# Patient Record
Sex: FEMALE | Race: BLACK OR AFRICAN AMERICAN | NOT HISPANIC OR LATINO | URBAN - METROPOLITAN AREA
[De-identification: names, ages, dates, MRNs, and addresses within clinical notes are randomized per-mention and may not be internally consistent; named-entity substitution may affect disease eponyms.]

---

## 2017-12-20 DIAGNOSIS — N92.6 IRREGULAR MENSTRUAL CYCLE: Primary | ICD-10-CM

## 2017-12-20 DIAGNOSIS — Z13.29 SCREENING FOR THYROID DISORDER: ICD-10-CM

## 2017-12-20 LAB
ERYTHROCYTE [DISTWIDTH] IN BLOOD BY AUTOMATED COUNT: 14.6 % (ref 10–15)
ESTRADIOL SERPL-MCNC: 33 PG/ML
FSH SERPL-ACNC: 6.3 IU/L
HBA1C MFR BLD: 5.8 % (ref 4.3–6)
HCT VFR BLD AUTO: 38.6 % (ref 35–47)
HGB BLD-MCNC: 12.7 G/DL (ref 11.7–15.7)
LH SERPL-ACNC: 2.3 IU/L
MCH RBC QN AUTO: 29.1 PG (ref 26.5–33)
MCHC RBC AUTO-ENTMCNC: 32.9 G/DL (ref 31.5–36.5)
MCV RBC AUTO: 88 FL (ref 78–100)
PLATELET # BLD AUTO: 235 10E9/L (ref 150–450)
RBC # BLD AUTO: 4.37 10E12/L (ref 3.8–5.2)
WBC # BLD AUTO: 4.1 10E9/L (ref 4–11)

## 2017-12-20 PROCEDURE — 83036 HEMOGLOBIN GLYCOSYLATED A1C: CPT | Performed by: OBSTETRICS & GYNECOLOGY

## 2017-12-20 PROCEDURE — 83002 ASSAY OF GONADOTROPIN (LH): CPT | Performed by: OBSTETRICS & GYNECOLOGY

## 2017-12-20 PROCEDURE — 84443 ASSAY THYROID STIM HORMONE: CPT | Performed by: OBSTETRICS & GYNECOLOGY

## 2017-12-20 PROCEDURE — 36415 COLL VENOUS BLD VENIPUNCTURE: CPT | Performed by: OBSTETRICS & GYNECOLOGY

## 2017-12-20 PROCEDURE — 80053 COMPREHEN METABOLIC PANEL: CPT | Performed by: OBSTETRICS & GYNECOLOGY

## 2017-12-20 PROCEDURE — 83520 IMMUNOASSAY QUANT NOS NONAB: CPT | Mod: 90 | Performed by: OBSTETRICS & GYNECOLOGY

## 2017-12-20 PROCEDURE — 82670 ASSAY OF TOTAL ESTRADIOL: CPT | Performed by: OBSTETRICS & GYNECOLOGY

## 2017-12-20 PROCEDURE — 85027 COMPLETE CBC AUTOMATED: CPT | Performed by: OBSTETRICS & GYNECOLOGY

## 2017-12-20 PROCEDURE — 83001 ASSAY OF GONADOTROPIN (FSH): CPT | Performed by: OBSTETRICS & GYNECOLOGY

## 2017-12-20 PROCEDURE — 99000 SPECIMEN HANDLING OFFICE-LAB: CPT | Performed by: OBSTETRICS & GYNECOLOGY

## 2017-12-21 LAB
ALBUMIN SERPL-MCNC: 3.5 G/DL (ref 3.4–5)
ALP SERPL-CCNC: 74 U/L (ref 40–150)
ALT SERPL W P-5'-P-CCNC: 18 U/L (ref 0–50)
ANION GAP SERPL CALCULATED.3IONS-SCNC: 7 MMOL/L (ref 3–14)
AST SERPL W P-5'-P-CCNC: 10 U/L (ref 0–45)
BILIRUB SERPL-MCNC: 0.5 MG/DL (ref 0.2–1.3)
BUN SERPL-MCNC: 8 MG/DL (ref 7–30)
CALCIUM SERPL-MCNC: 8.3 MG/DL (ref 8.5–10.1)
CHLORIDE SERPL-SCNC: 106 MMOL/L (ref 94–109)
CO2 SERPL-SCNC: 27 MMOL/L (ref 20–32)
CREAT SERPL-MCNC: 0.61 MG/DL (ref 0.52–1.04)
GFR SERPL CREATININE-BSD FRML MDRD: >90 ML/MIN/1.7M2
GLUCOSE SERPL-MCNC: 112 MG/DL (ref 70–99)
POTASSIUM SERPL-SCNC: 3.8 MMOL/L (ref 3.4–5.3)
PROT SERPL-MCNC: 7.2 G/DL (ref 6.8–8.8)
SODIUM SERPL-SCNC: 140 MMOL/L (ref 133–144)
TSH SERPL DL<=0.005 MIU/L-ACNC: 0.6 MU/L (ref 0.4–4)

## 2017-12-22 LAB — MIS SERPL-MCNC: 0.12 NG/ML (ref 0.18–11.71)

## 2018-03-07 DIAGNOSIS — H47.20 OPTIC ATROPHY OF BOTH EYES: Primary | ICD-10-CM

## 2018-03-12 ENCOUNTER — OFFICE VISIT (OUTPATIENT)
Dept: OPHTHALMOLOGY | Facility: CLINIC | Age: 38
End: 2018-03-12
Payer: MEDICAID

## 2018-03-12 DIAGNOSIS — H47.213 PRIMARY OPTIC ATROPHY OF BOTH EYES: Primary | ICD-10-CM

## 2018-03-12 ASSESSMENT — VISUAL ACUITY
OS_SC+: +1
CORRECTION_TYPE: GLASSES
OS_SC: 20/70
OD_SC: 20/60
OS_CC: 20/70
METHOD_MR_RETINOSCOPY: 1
OD_SC+: +2
OD_CC+: +1
OD_CC: 20/60
OS_CC+: +1
METHOD: SNELLEN - LINEAR

## 2018-03-12 ASSESSMENT — CONF VISUAL FIELD
OS_NORMAL: 1
OD_NORMAL: 1

## 2018-03-12 ASSESSMENT — REFRACTION_MANIFEST
OS_SPHERE: +1.25
OD_AXIS: 170
OS_CYLINDER: +0.75
OD_SPHERE: +0.50
OD_CYLINDER: +0.50
OS_AXIS: 005
OS_AXIS: 005
OS_ADD: +1.50
OS_SPHERE: +1.25
OS_CYLINDER: +0.75
OD_SPHERE: +0.50
OD_CYLINDER: +0.25
OD_AXIS: 170
OD_ADD: +1.50

## 2018-03-12 ASSESSMENT — REFRACTION_WEARINGRX
OD_SPHERE: +0.50
OS_ADD: +1.50
OS_SPHERE: +0.50
OS_CYLINDER: +0.25
OS_AXIS: 002
OD_ADD: +1.50
OD_CYLINDER: +0.75
OD_AXIS: 175
SPECS_TYPE: TRIAL FRAMES

## 2018-03-12 ASSESSMENT — TONOMETRY
OS_IOP_MMHG: 17
OD_IOP_MMHG: 19
IOP_METHOD: ICARE

## 2018-03-12 ASSESSMENT — EXTERNAL EXAM - RIGHT EYE: OD_EXAM: NORMAL

## 2018-03-12 ASSESSMENT — SLIT LAMP EXAM - LIDS
COMMENTS: NORMAL
COMMENTS: NORMAL

## 2018-03-12 ASSESSMENT — EXTERNAL EXAM - LEFT EYE: OS_EXAM: NORMAL

## 2018-03-12 NOTE — MR AVS SNAPSHOT
After Visit Summary   3/12/2018    Mayra Yee    MRN: [id number]           Patient Information     Date Of Birth          1980        Visit Information        Provider Department      3/12/2018 12:15 PM Lex Durand MD [facility] Ophthalmology        Today's Diagnoses     Primary optic atrophy of both eyes    -  1       Follow-ups after your visit        Follow-up notes from your care team     Return in about 1 year (around 3/12/2019) for RETURN OCULOPLASTICS, GTOP, RNFL OCT.      Who to contact     Please call your clinic at [phone] to:    Ask questions about your health    Make or cancel appointments    Discuss your medicines    Learn about your test results    Speak to your doctor            Additional Information About Your Visit        MyChart Information     [email or web address] is an electronic gateway that provides easy, online access to your medical records. With [employer], you can request a clinic appointment, read your test results, renew a prescription or communicate with your care team.     To sign up for [email or web address] visit the website at [email or web address]   You will be asked to enter the access code listed below, as well as some personal information. Please follow the directions to create your username and password.     Your access code is: [email or web address]  Expires: 2018  7:30 AM     Your access code will  in 90 days. If you need help or a new code, please contact your [facility] or call [phone] for assistance.        Care EveryWhere ID     This is your Care EveryWhere ID. This could be used by other organizations to access your [facility] medical records  [id number]         Blood Pressure from Last 3 Encounters:   No data found for BP    Weight from Last 3 Encounters:   No data found for Wt              We Performed the Following     Glaucoma Top OU     OCT Optic Nerve RNFL Spectralis OU (both eyes)        Primary Care Provider Fax #     Physician No Ref-Primary [phone]       No address on file        Equal Access to Services     BEVERLYAYANNA PINEDA : Hadii aad ku hadronaldfernandez Zambrano, wakaterynayobani arizmendi, garyrudolph alvarezjenniferyobani vieira, chepe seguraanthonykarin patiño. So [facility] [phone].    ATENCIÓN: Si habla español, tiene a valentin disposición servicios gratuitos de asistencia lingüística. Llame al [phone].    We comply with applicable federal civil rights laws and [place] laws. We do not discriminate on the basis of race, color, national origin, age, disability, sex, sexual orientation, or gender identity.            Thank you!     Thank you for choosing [facility] OPHTHALMOLOGY  for your care. Our goal is always to provide you with excellent care. Hearing back from our patients is one way we can continue to improve our services. Please take a few minutes to complete the written survey that you may receive in the mail after your visit with us. Thank you!             Your Updated Medication List - Protect others around you: Learn how to safely use, store and throw away your medicines at www.disposemymeds.org.          This list is accurate as of 3/12/18  1:45 PM.  Always use your most recent med list.                   Brand Name Dispense Instructions for use Diagnosis    VITAMIN D (CHOLECALCIFEROL) PO      Take 1 tablet by mouth PRN

## 2018-03-12 NOTE — PROGRESS NOTES
Chief Complaints and History of Present Illnesses   Patient presents with     Follow Up For     2 year f/u Optic Atrophy       Follow up for optic atrophy. Denies changes in vision. Denies new ocular symptoms. No new concerns today.    VF and OCT appear stable from 2016.       Mayra Yee is a 38 year old female with the following diagnoses:   1. Primary optic atrophy of both eyes       Stable exam. VF and RNFL OCT also stable.   DL form filled out   RTC in 1 year.    Qian Caceres MD  PGY-2 Ophthalmology  Attending Physician Attestation:  Complete documentation of historical and exam elements from today's encounter can be found in the full encounter summary report (not reduplicated in this progress note).  I personally obtained the chief complaint(s) and history of present illness.  I confirmed and edited as necessary the review of systems, past medical/surgical history, family history, social history, and examination findings as documented by others; and I examined the patient myself.  I personally reviewed the relevant tests, images, and reports as documented above.  I formulated and edited as necessary the assessment and plan and discussed the findings and management plan with the patient and family. I personally reviewed the ophthalmic test(s) associated with this encounter, agree with the interpretation(s) as documented by the resident/fellow, and have edited the corresponding report(s) as necessary.   -Lex Durand MD

## 2018-03-12 NOTE — NURSING NOTE
Chief Complaints and History of Present Illnesses   Patient presents with     Follow Up For     2 year f/u Optic Atrophy     HPI    Affected eye(s):  Both   Symptoms:     No blurred vision   No floaters   No flashes         Do you have eye pain now?:  No      Comments:    Patient notes she has no concerns, no vision changes from 2 years ago    Karmen Gant March 12, 2018 12:20 PM

## 2019-01-15 ENCOUNTER — TRANSFERRED RECORDS (OUTPATIENT)
Dept: HEALTH INFORMATION MANAGEMENT | Facility: CLINIC | Age: 39
End: 2019-01-15

## 2020-10-05 ENCOUNTER — OFFICE VISIT (OUTPATIENT)
Dept: OPHTHALMOLOGY | Facility: CLINIC | Age: 40
End: 2020-10-05
Payer: COMMERCIAL

## 2020-10-05 DIAGNOSIS — H47.20 OPTIC ATROPHY: Primary | ICD-10-CM

## 2020-10-05 PROBLEM — J30.2 SEASONAL ALLERGIES: Status: ACTIVE | Noted: 2020-02-04

## 2020-10-05 PROBLEM — M25.552 LEFT HIP PAIN: Status: ACTIVE | Noted: 2020-02-04

## 2020-10-05 PROBLEM — M79.605 LEFT LEG PAIN: Status: ACTIVE | Noted: 2020-02-04

## 2020-10-05 PROBLEM — I83.893 SYMPTOMATIC VARICOSE VEINS OF BOTH LOWER EXTREMITIES: Status: ACTIVE | Noted: 2017-12-18

## 2020-10-05 PROCEDURE — 92133 CPTRZD OPH DX IMG PST SGM ON: CPT | Performed by: OPHTHALMOLOGY

## 2020-10-05 PROCEDURE — 92083 EXTENDED VISUAL FIELD XM: CPT | Performed by: OPHTHALMOLOGY

## 2020-10-05 PROCEDURE — 92014 COMPRE OPH EXAM EST PT 1/>: CPT | Mod: GC | Performed by: OPHTHALMOLOGY

## 2020-10-05 ASSESSMENT — REFRACTION_MANIFEST
OD_ADD: +2.00
OD_SPHERE: +0.50
OS_AXIS: 005
OD_AXIS: 170
OS_ADD: +2.00
OS_CYLINDER: +0.75
OS_SPHERE: +1.25
OD_CYLINDER: +0.50

## 2020-10-05 ASSESSMENT — VISUAL ACUITY
OS_SC: 20/60
METHOD: SNELLEN - LINEAR
OD_SC+: -2
OD_SC: 20/60
OS_SC+: -1

## 2020-10-05 ASSESSMENT — SLIT LAMP EXAM - LIDS
COMMENTS: NORMAL
COMMENTS: NORMAL

## 2020-10-05 ASSESSMENT — TONOMETRY
OD_IOP_MMHG: 11
IOP_METHOD: ICARE
OS_IOP_MMHG: 13

## 2020-10-05 ASSESSMENT — CONF VISUAL FIELD
METHOD: COUNTING FINGERS
OS_NORMAL: 1
OD_NORMAL: 1

## 2020-10-05 ASSESSMENT — EXTERNAL EXAM - RIGHT EYE: OD_EXAM: NORMAL

## 2020-10-05 ASSESSMENT — EXTERNAL EXAM - LEFT EYE: OS_EXAM: NORMAL

## 2020-10-05 NOTE — PROGRESS NOTES
Chief Complaints and History of Present Illnesses   Patient presents with     Optic Atrophy Follow Up     Chief Complaint(s) and History of Present Illness(es)     Optic Atrophy Follow Up     In both eyes.  Associated symptoms include Negative for eye pain,   headache, double vision, floaters and flashes.  Pain was noted as 0/10.              Comments     Patient notes that she is doing well overall, denies any parts of [place]   missing or blurred vision. She doesn't wear glasses, she would like to   wear gls, she saw her regular eye doctor about 1.5 years ago, she notes   they do her gls RX there.     Karmen Gant COT October 5, 2020 2:29 PM                     Assessment & Plan     Mayra Yee is a 40 year old female with the following diagnoses:   1. Optic atrophy         Stable, no changes in vision, RNFL OCT with stable temporal thinning, VF with high false positives, tiny central scotoma right eye, normal field left eye.    Plan:  - RTC 1 year in neuroophthalmology            Cedric Leyva MD, MPH  Oculoplastics Fellow  Attending Physician Attestation:  Complete documentation of historical and exam elements from today's encounter can be found in the full encounter summary report (not reduplicated in this progress note).  I personally obtained the chief complaint(s) and history of present illness.  I confirmed and edited as necessary the review of systems, past medical/surgical history, family history, social history, and examination findings as documented by others; and I examined the patient myself.  I personally reviewed the relevant tests, images, and reports as documented above.  I formulated and edited as necessary the assessment and plan and discussed the findings and management plan with the patient and family.  I personally reviewed the ophthalmic test(s) associated with this encounter, agree with the interpretation(s) as documented by the resident/fellow, and have edited the corresponding report(s)  as necessary.   -Lex Durand MD

## 2020-10-05 NOTE — NURSING NOTE
Chief Complaints and History of Present Illnesses   Patient presents with     Optic Atrophy Follow Up     Chief Complaint(s) and History of Present Illness(es)     Optic Atrophy Follow Up     Laterality: both eyes    Associated symptoms: Negative for eye pain, headache, double vision, floaters and flashes    Pain scale: 0/10              Comments     Patient notes that she is doing well overall, denies any parts of [place] missing or blurred vision. She doesn't wear glasses, she would like to wear gls, she saw her regular eye doctor about 1.5 years ago, she notes they do her gls RX there.     Karmen Gant COT October 5, 2020 2:29 PM

## 2020-11-17 ENCOUNTER — ALLIED HEALTH/NURSE VISIT (OUTPATIENT)
Dept: OPHTHALMOLOGY | Facility: CLINIC | Age: 40
End: 2020-11-17
Payer: COMMERCIAL

## 2020-11-17 DIAGNOSIS — H47.219: Primary | ICD-10-CM

## 2020-11-17 PROCEDURE — 92081 LIMITED VISUAL FIELD XM: CPT

## 2020-11-17 PROCEDURE — 99207 PR NO CHARGE LOS: CPT

## 2020-11-17 NOTE — PROGRESS NOTES
History  HPI     Follow Up     In both eyes.              Comments     The patient presents for DMV visual field. Form previously lacked visual field information.          Last edited by Lex Stuart, OD on 11/17/2020  3:46 PM. (History)          Assessment/Plan  (H47.219) Primary optic atrophy  (primary encounter diagnosis)  Comment: Poor subjective response to automated visual field testing, unreliable results  Plan: Stout Visual Field DMV OU (both eyes), Stout VF DMV OU         Visual field paperwork not completed. Recommended manual Goldmann field to reassess horizontal field prior to completing paperwork.    Complete documentation of historical and exam elements from today's encounter can  be found in the full encounter summary report (not reduplicated in this progress  note). I personally obtained the chief complaint(s) and history of present illness. I  confirmed and edited as necessary the review of systems, past medical/surgical  history, family history, social history, and examination findings as documented by  others; and I examined the patient myself. I personally reviewed the relevant tests,  images, and reports as documented above. I formulated and edited as necessary the  assessment and plan and discussed the findings and management plan with the  patient and family.    Lex Stuart, OD, FAAO

## 2020-11-23 ENCOUNTER — ALLIED HEALTH/NURSE VISIT (OUTPATIENT)
Dept: OPHTHALMOLOGY | Facility: CLINIC | Age: 40
End: 2020-11-23
Attending: OPHTHALMOLOGY
Payer: COMMERCIAL

## 2020-11-23 ENCOUNTER — ALLIED HEALTH/NURSE VISIT (OUTPATIENT)
Dept: OPHTHALMOLOGY | Facility: CLINIC | Age: 40
End: 2020-11-23
Payer: COMMERCIAL

## 2020-11-23 DIAGNOSIS — H47.219: Primary | ICD-10-CM

## 2020-11-23 PROCEDURE — 99207 PR NO CHARGE COORDINATED CARE PS: CPT

## 2020-11-23 PROCEDURE — 99207 PR NO CHARGE LOS: CPT

## 2021-07-09 NOTE — TELEPHONE ENCOUNTER
FUTURE VISIT INFORMATION      FUTURE VISIT INFORMATION:    Date: 10/6/21    Time: 9:30am    [place] [facility] [place]FORMATION:    Referring provider:  Ladarius    Referring providers clinic:  [facility]    Reason for visit/diagnosis  1 year follow up for Optic atrophy     RECORDS REQUESTED FROM:       Clinic name Comments Records Status Imaging Status   [facility] Ov/referral 10/5/20  Ov/notes 1/27/12-11/23/20 EPIC

## 2021-10-06 ENCOUNTER — OFFICE VISIT (OUTPATIENT)
Dept: OPHTHALMOLOGY | Facility: CLINIC | Age: 41
End: 2021-10-06
Payer: COMMERCIAL

## 2021-10-06 ENCOUNTER — PRE VISIT (OUTPATIENT)
Dept: OPHTHALMOLOGY | Facility: CLINIC | Age: 41
End: 2021-10-06

## 2021-10-06 DIAGNOSIS — H53.40 VISUAL FIELD DEFECT: Primary | ICD-10-CM

## 2021-10-06 DIAGNOSIS — H53.40 VISUAL FIELD DEFECT: ICD-10-CM

## 2021-10-06 DIAGNOSIS — H47.213 PRIMARY OPTIC ATROPHY OF BOTH EYES: Primary | ICD-10-CM

## 2021-10-06 PROCEDURE — 92133 CPTRZD OPH DX IMG PST SGM ON: CPT | Performed by: OPHTHALMOLOGY

## 2021-10-06 PROCEDURE — 92014 COMPRE OPH EXAM EST PT 1/>: CPT | Mod: GC | Performed by: OPHTHALMOLOGY

## 2021-10-06 PROCEDURE — 92083 EXTENDED VISUAL FIELD XM: CPT | Performed by: OPHTHALMOLOGY

## 2021-10-06 ASSESSMENT — TONOMETRY
OD_IOP_MMHG: 12
IOP_METHOD: ICARE
OS_IOP_MMHG: 14

## 2021-10-06 ASSESSMENT — VISUAL ACUITY
METHOD_MR_RETINOSCOPY: 1
OS_SC+: +1
OS_SC: 20/70
OD_SC: 20/70
OD_SC+: +2
METHOD: SNELLEN - LINEAR

## 2021-10-06 ASSESSMENT — REFRACTION_MANIFEST
OD_SPHERE: +1.00
OD_CYLINDER: +1.50
OS_AXIS: 005
OS_ADD: +1.75
OD_ADD: +1.75
OS_AXIS: 005
OS_SPHERE: +0.50
OD_AXIS: 165
OS_SPHERE: +0.50
OD_AXIS: 165
OD_CYLINDER: +1.25
OS_CYLINDER: +1.25
OD_SPHERE: +0.50
OS_CYLINDER: +1.00

## 2021-10-06 ASSESSMENT — EXTERNAL EXAM - LEFT EYE: OS_EXAM: NORMAL

## 2021-10-06 ASSESSMENT — SLIT LAMP EXAM - LIDS
COMMENTS: NORMAL
COMMENTS: NORMAL

## 2021-10-06 ASSESSMENT — CONF VISUAL FIELD
OD_NORMAL: 1
OS_NORMAL: 1

## 2021-10-06 ASSESSMENT — EXTERNAL EXAM - RIGHT EYE: OD_EXAM: NORMAL

## 2021-10-06 NOTE — PROGRESS NOTES
"     Assessment & Plan     Mayra Yee is a 41 year old female with the following diagnoses:   1. Primary optic atrophy of both eyes    2. Visual field defect         Patient was sent for consultation by Dr. Lex Durand for optic atrophy.      HPI:  Patient normally follows with Dr. Durand for optic atrophy in both eyes and has been seeing him since 2012. The chronicity of her optic atrophy is somewhat unclear; however there is an MRI of the brain wwo contrast in 2/2002 with the test indication noted as \"optic atrophy.\" Interpreted as unremarkable. No lab results regarding workup are available.    The patient herself has been stable visually since her last visit with Dr. Ricardo. She was given a prescription for distance glasses that (refracts to 20/20 in both eyes) but apparently has not been using this. She denies headaches, double vision. changes in color vision, nyctalopia.     She denies any family history of vision loss. She has 3 healthy sisters.    She denies being malnourished at any point in her life, no dietary deficits growing.     Independent historians:  Patient    Review of outside testing:  MRI brain wwo contrast 2/15/2002  CONCLUSION:     1.    No evidence of intracranial pathology.     2.    The orbits and suprasellar region are unremarkable, although the       optic nerves are borderline small.     3.    Retention cyst, left maxillary sinus.        My interpretation performed today of outside testing:  Old visual fields appear normal, though have a high degree of false positives. Manual Goldmann field 11/2020 appears WNL    Review of outside clinical notes:  Dr. Durand's notes    Past medical history:   Denies     Medications:   Vitamin D.     Family history / social history:  Denies. Healthy parents, sisters (3).    Exam:  Visual acuity without correction was 20/70 in the right eye and 20/70 in the left eye. IOP was 12 in the right eye and 14 in the left eye. Visual fields were full in " both eyes. Ocular motility was full in all gaze directions. Color plates were 6/11 in the right eye and 7/11 in the left eye.  Pupils were equal, round and reactive to light with no RAPD.     Strabismus exam: full ortho  Anterior segment exam: 2+ NS, 1+ cortical changes OU  Dilated fundus exam: Stable    Tests ordered and interpreted today:  OCT rNFL demonstrated a mean NFL thickness of 87 (from 85) in the right eye and 94 (from 87) in the left eye. These measurements have been stable since at least 2016. Thickness profile demonstrated   VF: Glaucoma TOP visual field was performed. Test was was unreliable with high false positives.. (100% in both eyes).      Discussion of management / interpretation with another provider:   None    Assessment/Plan:   Optic atrophy - longstanding, stable, and not progressive. Unclear etiology and the patient is unaware of how long its been going on.  Since it has not changed substantially and the MRI was normal in the past, I would recommend observation.  She could consider low vision services if she desires.  Follow-up 1 year sooner as needed for worsening symptoms.          Anthony Serrano, DO   PGY-5 Neuro-Ophthalmology Fellow     Attending Physician Attestation:  Complete documentation of historical and exam elements from today's encounter can be found in the full encounter summary report (not reduplicated in this progress note).  I personally obtained the chief complaint(s) and history of present illness.  I confirmed and edited as necessary the review of systems, past medical/surgical history, family history, social history, and examination findings as documented by others; and I examined the patient myself.  I personally reviewed the relevant tests, images, and reports as documented above.  I formulated and edited as necessary the assessment and plan and discussed the findings and management plan with the patient and family. I personally reviewed the ophthalmic test(s) associated  with this encounter, agree with the interpretation(s) as documented by the resident/fellow, and have edited the corresponding report(s) as necessary.  - Floyd Barraza MD

## 2021-10-06 NOTE — NURSING NOTE
Chief Complaints and History of Present Illnesses   Patient presents with     Blurred Vision Follow-Up     Chief Complaint(s) and History of Present Illness(es)     Blurred Vision Follow-Up               Comments     Mayra Yee is a 40 year old female with the following diagnoses:   1. Optic atrophy      No significant change in vision since the last visit.     Grey PEDERSEN 9:54 AM October 6, 2021

## 2021-10-14 ENCOUNTER — TRANSFERRED RECORDS (OUTPATIENT)
Dept: HEALTH INFORMATION MANAGEMENT | Facility: CLINIC | Age: 41
End: 2021-10-14
Payer: COMMERCIAL

## 2021-10-27 ENCOUNTER — TRANSFERRED RECORDS (OUTPATIENT)
Dept: HEALTH INFORMATION MANAGEMENT | Facility: CLINIC | Age: 41
End: 2021-10-27
Payer: COMMERCIAL

## 2022-02-02 ENCOUNTER — MEDICAL CORRESPONDENCE (OUTPATIENT)
Dept: HEALTH INFORMATION MANAGEMENT | Facility: CLINIC | Age: 42
End: 2022-02-02
Payer: COMMERCIAL

## 2022-02-03 ENCOUNTER — TRANSCRIBE ORDERS (OUTPATIENT)
Dept: MULTI SPECIALTY CLINIC | Facility: CLINIC | Age: 42
End: 2022-02-03
Payer: COMMERCIAL

## 2022-02-03 DIAGNOSIS — N96 RECURRENT PREGNANCY LOSS: ICD-10-CM

## 2022-02-03 DIAGNOSIS — R76.8 ELEVATED ANTINUCLEAR ANTIBODY (ANA) LEVEL: Primary | ICD-10-CM

## 2022-02-04 ENCOUNTER — TELEPHONE (OUTPATIENT)
Dept: RHEUMATOLOGY | Facility: CLINIC | Age: 42
End: 2022-02-04

## 2022-02-04 NOTE — TELEPHONE ENCOUNTER
[facility] Call Center    Phone Message    May a detailed message be left on voicemail: no     Reason for Call: Appointment Intake    Referring Provider Name: Angel Krause MD  Diagnosis and/or Symptoms: Elevated antinuclear antibody (ROBINA) level [R76.8]  Recurrent pregnancy loss [N96]    Records have been forwarded to [phone]    Action Taken: Message routed to:  [facility] ([facility] Rheum    Travel Screening: Not Applicable

## 2022-02-23 NOTE — TELEPHONE ENCOUNTER
Referral and records are in media tab of chart as of 2/16. Referral and records were also forwarded today (2/23) to [email or web address]

## 2022-03-03 NOTE — TELEPHONE ENCOUNTER
Reviewed referral, patient is 42 years old with a history of 6 pregnancy losses since 2012, three successful pregnancies. ROBINA 1:80 speckled pattern, lupus anticoagulant negative. Per Dr. Valenzuela, patient should be seen in maternal fetal medicine for evaluation of multiple pregnancy losses and a referral will then be made to rheumatology if appropriate.   Yasmine Arias RN  [facility]

## 2022-03-16 ENCOUNTER — TRANSCRIBE ORDERS (OUTPATIENT)
Dept: MATERNAL FETAL MEDICINE | Facility: CLINIC | Age: 42
End: 2022-03-16
Payer: COMMERCIAL

## 2022-03-16 ENCOUNTER — MEDICAL CORRESPONDENCE (OUTPATIENT)
Dept: HEALTH INFORMATION MANAGEMENT | Facility: CLINIC | Age: 42
End: 2022-03-16
Payer: COMMERCIAL

## 2022-03-16 DIAGNOSIS — O26.90 PREGNANCY RELATED CONDITION, ANTEPARTUM: Primary | ICD-10-CM

## 2022-03-20 ENCOUNTER — TRANSCRIBE ORDERS (OUTPATIENT)
Dept: MATERNAL FETAL MEDICINE | Facility: CLINIC | Age: 42
End: 2022-03-20
Payer: COMMERCIAL

## 2022-03-20 DIAGNOSIS — Z31.69 ENCOUNTER FOR PRECONCEPTION CONSULTATION: Primary | ICD-10-CM

## 2022-03-20 DIAGNOSIS — O26.90 PREGNANCY RELATED CONDITION, ANTEPARTUM: Primary | ICD-10-CM

## 2022-04-28 ENCOUNTER — PRE VISIT (OUTPATIENT)
Dept: MATERNAL FETAL MEDICINE | Facility: CLINIC | Age: 42
End: 2022-04-28
Payer: COMMERCIAL

## 2022-04-28 PROBLEM — J31.2 CHRONIC PHARYNGITIS: Status: ACTIVE | Noted: 2021-12-10

## 2022-04-28 PROBLEM — M25.512 LEFT SHOULDER PAIN: Status: ACTIVE | Noted: 2021-12-10

## 2022-04-28 PROBLEM — I87.2 VENOUS INCOMPETENCE: Status: ACTIVE | Noted: 2017-12-18

## 2022-04-28 RX ORDER — CETIRIZINE HYDROCHLORIDE 10 MG/1
1 TABLET ORAL DAILY
COMMUNITY
Start: 2021-12-10

## 2022-04-28 RX ORDER — VITAMIN A ACETATE, BETA CAROTENE, ASCORBIC ACID, CHOLECALCIFEROL, .ALPHA.-TOCOPHEROL ACETATE, DL-, THIAMINE MONONITRATE, RIBOFLAVIN, NIACINAMIDE, PYRIDOXINE HYDROCHLORIDE, FOLIC ACID, CYANOCOBALAMIN, CALCIUM CARBONATE, FERROUS FUMARATE, ZINC OXIDE, CUPRIC OXIDE 3080; 12; 120; 400; 1; 1.84; 3; 20; 22; 920; 25; 200; 27; 10; 2 [IU]/1; UG/1; MG/1; [IU]/1; MG/1; MG/1; MG/1; MG/1; MG/1; [IU]/1; MG/1; MG/1; MG/1; MG/1; MG/1
1 TABLET, FILM COATED ORAL DAILY
COMMUNITY
Start: 2021-12-10 | End: 2022-12-10

## 2022-04-28 RX ORDER — PHENTERMINE HYDROCHLORIDE 15 MG/1
1 CAPSULE ORAL DAILY
COMMUNITY
Start: 2021-12-10

## 2022-04-28 RX ORDER — CEPHALEXIN 500 MG/1
CAPSULE ORAL
COMMUNITY
Start: 2021-11-30

## 2022-05-04 NOTE — PROGRESS NOTES
Maternal-Fetal Medicine Consultation    Mayra Yee  : 1980  MRN: [id number]    REFERRAL:  Mayra Yee is a 42 year old sent by Dr. Krause from [facility] for MFM consultation.    HPI:  Mayra Yee is a 42 year old  here for MFM consultation regarding recurrent pregnancy loss and antinuclear antibody positive.    She is here with her , Antonio. They decline using a .    She is here because she is concerned that she has lupus and wants to know how this is affecting her multiple miscarriages. She is interested in having 1-2 more children, if possible, and wants to know what can be done to achieve this. She has a history of a full term delivery in [facility] in , though the infant  at 6 months of life. Her next pregnancy and delivery was in  and she delivered a liveborn female vaginally. Her next delivery was in  and underwent a term primary  section for malpresentation. She then underwent a  in  and delivered a male. Aside from the  demise in her first pregnancy, all of her children are doing well and are developmentally normal. Since , she has had six first trimester miscarriages. All but one resolved spontaneously, though she had a dilation and curettage for the miscarriage in . Products of conception were sent for chromosomal evaluation, which revealed trisomy 21 of the conceptus.     Given the multiple miscarriages, she has been seen by her doctor and has had an evaluation, notable for HbA1c of 5.9%, TSH 1.10, lupus anticoagulant and cardiolipin antibody negative, and normal HSG. She also had additional evaluation for thrombophilia which was unremarkable as well as an elevated ROBINA titer of 1:80. She denies any skin rashes, sun sensitivity, joint pain, prior heart/lung conditions, low blood counts.     Of note, she has received two COVID-19 vaccinations and her last vaccination was over one year ago. She has not received her  COVID booster. She does not feel that COVID is a concern anymore and does not see a need to received her booster. Her  has received the booster, however.     Obstetrics History:  OB History    Para Term  AB Living   10 4 4 0 6 3   SAB IAB Ectopic Multiple Live Births   6 0 0 0 4      # Outcome Date GA Lbr Drew/2nd Weight Sex Delivery Anes PTL Lv   10 2016           9 SAB 2015 11w0d          8 2015 11w0d          7 2014 11w0d          6 2013 11w0d          5 SAB 10/2012 11w0d          4 Term 11 40w5d 07:00 3.175 kg (7 lb) M Vag-Spont EPI N YANIRA      Name: ha   3 Term 03/20/10    M CS-LTranv   YANIRA      Birth Comments: Anglican, breech   2 Term 00    F Vag-Spont   YANIRA      Birth Comments: Rowan Clarke   1 Term 98     Vag-Spont   DEC      Birth Comments:  in [place] 6 months       Gynecologic History:  - Menstrual history: Menses regular, q 28 days, LMP: Around  (does not recall exact date)  - Last Pap: 2019 and normal per her provider's documentation  - Denies any history of abnormal pap smears  - Denies prior cervical surgery or procedures  - Denies any history of frequent UTIs, vaginal infections, or STIs    Past Medical History:  Past Medical History:   Diagnosis Date     Bilateral optic atrophy since      Low, vision, both eyes     has used [facility] services     Miscarriage        Past Surgical History:  Past Surgical History:   Procedure Laterality Date      SECTION         Current Medications:  Prenatal vitamin  Vitamin D    Allergies:  Ampicillin    Social History:   Occupation: Homemaker  Status:   Denies use of alcohol, drugs or smoking.    Family History:  Father-HTN, MI  Mother-Healthy  Siblings-healthy  Denies history of genetic disorders, preeclampsia, thromboembolic disease, bleeding disorders, developmental delay.    Partner History  Antonio, 54, has kidney failure of unclear  etiology, not on dialysis    ROS:  10-point ROS negative except as in HPI     PHYSICAL EXAM:    General Appearance: No acute distress    Labs:   Hemoglobin A1C 5.9%  TSH 1.10  AMH 0.01  ROBINA 1:80 speckled  Lupus anticoagulant negative  Cardiolipin negative     ASSESSMENT/PLAN:  Mayra Yee is a 42 year old  here for M consultation regarding:     Recurrent pregnancy loss (RPL)  We discussed that the definition of recurrent pregnancy loss varies based on which guideline is referenced. It is classically defined as three or more consecutive pregnancy losses, however, the American Society of Reproductive Medicine (ASRM) recently redefined recurrent pregnancy loss as two or more pregnancy losses. We reviewed that the majority or pregnancy losses are secondary to chromosomal/genetic abnormalities or are random events. This workup includes:    - Maternal and paternal genetic evaluation including karyotypes to assess for a possible genetic etiology   - Evaluation for anatomic abnormalities (eg, uterine cavity malformations)  - Assessment for acquired thrombophilia (Antiphospholipid Syndrome)   - Evaluation of maternal medical conditions that may be contributing   - Assessment of current medication/teratogen exposures     In the case of Ms. Yee, she has had four prior full term deliveries with the same partner, followed by six consecutive miscarriages. All but one of the miscarriages resolved spontaneously, though she had a dilation and curettage in 2015, allowing for cytogenetic evaluation. This evaluation revealed trisomy 21. She has also had work-up including screening for medical conditions that could contribute to RPL, including normal A1C and TSH. She had an evaluation of the uterine cavity via HSG, which was normal. She also had a partial evaluation for antiphospholipid antibody syndrome, including negative lupus anticoagulant and cardiolipin antibodies. B2 glycoprotein antibody evaluation is also  recommended and was ordered for Ms. Yee today per her request. No teratogenic exposures were identified by history today.     We discussed that the most likely etiology of recurrent pregnancy loss are chromosomal abnormalities as the risk for this occurring increasing with advanced maternal age. Furthermore, chromosomal abnormalities can often be causes of early pregnancy losses. She met with our genetic counselor today to discuss obtaining parental karyotypes as well as carrier screening, though we discussed that this will likely be normal.  We reviewed the option for referral to a reproductive endocrine infertility specialist to discuss options to achieve pregnancy, including additional evaluation for RPL that they see fit, as well as discussion of use of donor egg and/or sperm, which could potentially increase the chances of a viable pregnancy. Ms. Yee and her  were appreciative of the information, but are not interested in referral to an BAMBI at this time.    An additional concern that Ms. Yee and her  had was the possibility that she has Systemic Lupus Erythematosus given that she had a titer of 1:80. We reviewed that this test is non-specific for SLE without additional signs/symptoms, including but not limited to malar rash, sun sensitivity, muscle/joint pain, oral ulcers, hematuria, pleurisy, hypertension, or lab abnormalities (anemia, thrombocytopenia). Given that she has no other manifestations of lupus, it is unlikely that she has SLE and unlikely that this is contributing to her recurrent losses. We reviewed that, at this time, no further evaluation for SLE is recommended, unless new multi-system signs/symptoms arise. Would not recommend evaluation via rheumatology at this time.     Recommendations:  Medications  - While attempting to conceive, continue prenatal vitamins daily  - We discussed recommendation to received COVID-19 booster as she is a candidate at this time. We reviewed risks of  COVID-19 in general as well as during pregnancy. She will continue to consider getting the booster.    Laboratory evaluation  - To complete the evaluation for antiphospholipid antibody syndrome, B2 glycoprotein was ordered at this clinic. We will follow up on the results.  - She is interested in parental karyotype for her and her , though request insurance investigation into coverage for the tests prior to proceeding. Our genetic counselor will look into coverage and schedule follow-up.     Pregnancy Management:  - Upon pregnancy, recommend daily low dose aspirin 81mg for preeclampsia prophylaxis starting between 12-16 weeks' gestation  - Early dating US  - Genetic screening evaluation, including but not limited to nuchal translucency US with serologic screening  - Level II Comprehensive US between 18-20 weeks  - If chronic medication is not being used and serial growth is not implemented for other comorbidities, then single growth assessment in the third trimester.  - For women ? 40 years old, we recommend  testing usually with weekly BPP (or NST with MVP) beginning at 36 weeks.  - Consider induction of labor at 39 - 40 weeks, but no later than 41 weeks.      The patient was seen and evaluated with Dr. Fry.    Thank you for allowing us to participate in the care of your patient. Please do not hesitate to contact us if you have further questions regarding the management of your patient.       Otilia Springer MD  Maternal Fetal Medicine Fellow    [facility] Attending Attestation  I have seen and evaluated the patient with Dr. Springer. I reviewed her chart and agree with the above documented assessment and plan. I spent a total of 60 minutes on the date of this encounter including preparing to see the patient (reviewing medical records/tests), in direct face-to-face contact with the patient during her visit with the majority (>50%) spent counseling and discussing the plan of care, documenting the visit in the  electronic medical record, and communicating with other health care professionals and/or care coordination.Please see her note for specific details; I have made the necessary edits/additions.  Michelle Fry MD  Maternal Fetal Medicine Physician

## 2022-05-05 ENCOUNTER — OFFICE VISIT (OUTPATIENT)
Dept: MATERNAL FETAL MEDICINE | Facility: CLINIC | Age: 42
End: 2022-05-05
Attending: OBSTETRICS & GYNECOLOGY
Payer: COMMERCIAL

## 2022-05-05 ENCOUNTER — LAB (OUTPATIENT)
Dept: LAB | Facility: CLINIC | Age: 42
End: 2022-05-05
Attending: OBSTETRICS & GYNECOLOGY
Payer: COMMERCIAL

## 2022-05-05 DIAGNOSIS — N96 RECURRENT PREGNANCY LOSS: ICD-10-CM

## 2022-05-05 DIAGNOSIS — Z31.5 ENCOUNTER FOR PROCREATIVE GENETIC COUNSELING AND TESTING: ICD-10-CM

## 2022-05-05 DIAGNOSIS — N96 RECURRENT PREGNANCY LOSS: Primary | ICD-10-CM

## 2022-05-05 DIAGNOSIS — Z31.69 ENCOUNTER FOR PRECONCEPTION CONSULTATION: ICD-10-CM

## 2022-05-05 PROCEDURE — 86146 BETA-2 GLYCOPROTEIN ANTIBODY: CPT

## 2022-05-05 PROCEDURE — 36415 COLL VENOUS BLD VENIPUNCTURE: CPT

## 2022-05-05 PROCEDURE — 999N000069 HC STATISTIC GENETIC COUNSELING, < 16 MIN: Performed by: GENETIC COUNSELOR, MS

## 2022-05-05 PROCEDURE — 99205 OFFICE O/P NEW HI 60 MIN: CPT | Mod: GC | Performed by: OBSTETRICS & GYNECOLOGY

## 2022-05-05 NOTE — PROGRESS NOTES
[facility] [place]  Genetic Counseling Consult    Patient: Mayra Yee YOB: 1980   Date of Service: 5/05/22      Mayra Yee was seen at [facility] [place] for genetic consultation to discuss the options for evaluation due to the couple's history of recurrent unexplained miscarriage.       Impression/Plan:   1.  Mayra had an MFM consult today to discuss her history of recurrent miscarriage.  Please see corresponding documentation for complete details. At the conclusion of that appointment, Mayra was offered to meet with genetic counseling to discuss genetic evaluations for RPL.      2.  After a brief conversation of the possible genetic tests that could be done for couple's experiencing recurrent pregnancy loss, the couple requests an insurance investigation into coverage for parental chromosome analysis prior to proceeding further. Genetic counseling will investigate coverage and follow up with the couple to coordinate a genetic counseling appointment.     3.  Mayra completed a consent to communicate with her  Evan Cash, [phone] to discuss insurance coverage and care coordination moving forward.       It was a pleasure to be involved with Mayra s care. Face-to-face time of the meeting was 15 minutes.      Cedric Lackey MS, [facility]  Licensed Genetic Counselor  Phone: [phone]  Pager: [phone]

## 2022-05-05 NOTE — NURSING NOTE
Mayra seen in clinic today at Unknown gestation for MFM Consult/GC (see separate note) d/t recurrent pregnancy loss. Meds and allergies reviewed. Dr. Springer and Dr. Fry met with pt and discussed POC. No future follow up needed. Pt discharged stable and ambulatory, walked to lab per this RN.    Alma Rosa Acevedo RN

## 2022-05-06 LAB
B2 GLYCOPROT1 IGG SERPL IA-ACNC: <0.8 U/ML
B2 GLYCOPROT1 IGM SERPL IA-ACNC: <2.4 U/ML

## 2023-03-23 ENCOUNTER — TELEPHONE (OUTPATIENT)
Dept: OPHTHALMOLOGY | Facility: CLINIC | Age: 43
End: 2023-03-23
Payer: COMMERCIAL

## 2023-03-23 NOTE — TELEPHONE ENCOUNTER
[facility] Call Center    Phone Message    May a detailed message be left on voicemail: yes     Reason for Call: Other: Pt calling in requesting an appointment with Dr Durand for Optic Atrophy. Pt was last seen 10/05/2020 and was referred to see neuroophthalmology, per the protocols writer unable to schedule patients with Dr Kinsey. please call back for scheduling options      Action Taken: Message routed to:  Other: eye     Travel Screening: Not Applicable

## 2023-03-23 NOTE — TELEPHONE ENCOUNTER
Pt seen by Dr. Barraza in 2021 for optic atrophy and to continue f/u with Dr. Barraza/neuro-ophthalmology per last note and note from Dr. Durand    Note to patient communicator to assist in scheduling with Dr. Barraza.    Okay to let me know if pt has additional inquiries for me to call    Jae Alex RN 3:24 PM 03/23/23

## 2023-03-27 ENCOUNTER — TELEPHONE (OUTPATIENT)
Dept: OPHTHALMOLOGY | Facility: CLINIC | Age: 43
End: 2023-03-27
Payer: COMMERCIAL

## 2023-03-27 NOTE — TELEPHONE ENCOUNTER
Called and LVM with interp     Seynab can make an appointment with Dr. Barraza for next available  for optic atrophy     Helen Kinsey Communication Facilitator on 3/27/2023 at 2:59 PM

## 2023-04-04 ENCOUNTER — TELEPHONE (OUTPATIENT)
Dept: OPHTHALMOLOGY | Facility: CLINIC | Age: 43
End: 2023-04-04
Payer: COMMERCIAL

## 2023-04-04 NOTE — TELEPHONE ENCOUNTER
[facility] Call Center    Phone Message    May a detailed message be left on voicemail: yes     Reason for Call: Other: Patient called requesting a call back. Writer tried explaining patient is to follow up with Dr. Barraza. She kept insisting on Dr. Durand. Please call to further discuss she is not understanding. Thanks.      Action Taken: Other: eye    Travel Screening: Not Applicable

## 2023-04-04 NOTE — TELEPHONE ENCOUNTER
I called  per request below at 1400 and left message with direct number for wife/patient to call    Jae Alex RN 2:00 PM 04/04/23

## 2023-04-04 NOTE — TELEPHONE ENCOUNTER
Spoke to pt at 1510    Reviewed Dr. Durand (pt previous trying to schedule with Dr. Durand) works with oculoplastics primarily and pt has seen Dr. Barraza also and recommendation to continue care with Dr. Barraza    Pt states Dr. Durand typically fills out form/DMV forms and Dr. Barraza scheduling out a ways-- pt trying to see Dr. Durand for DMV form.    Reviewed Dr. Mcmullen able to see pt also for exam/DMV form and earlier scheduling (neuro-ophthalmology team reviewed)    Pt comfortable scheduling with Dr. Mcmullen and scheduled next Monday April 10th.    Pt aware of date/time/location at [facility]    Jae Alex RN 3:17 PM 04/04/23          [facility] Call Center    Phone Message    May a detailed message be left on voicemail: yes     Reason for Call: Other: Pt would like to stop by and drop off some forms that she needs completed for the DMV. Pt would liek to confirm w/  on whens a good time for her to stop by. Please reach out to pt and verify. Thank you!     Action Taken: Message routed to:  [facility] eye    Travel Screening: Not Applicable

## 2023-04-04 NOTE — TELEPHONE ENCOUNTER
[facility] Call Center    Phone Message    May a detailed message be left on voicemail: yes     Reason for Call: Other: Pt  calling back in requesting the clinic to call back [phone]     Action Taken: Message routed to:  Other: eye    Travel Screening: Not Applicable

## 2023-04-04 NOTE — TELEPHONE ENCOUNTER
Spoke to pt at 1510    Reviewed Dr. Durand (pt previous trying to schedule with Dr. Durand) works with oculoplastics primarily and pt has seen Dr. Barraza also and recommendation to continue care with Dr. Barraza    Pt states Dr. Durand typically fills out form/DMV forms and Dr. Barraza scheduling out a ways-- pt trying to see Dr. Durand for DMV form.    Reviewed Dr. Mcmullen able to see pt also for exam/DMV form and earlier scheduling (neuro-ophthalmology team reviewed)    Pt comfortable scheduling with Dr. Mcmullen and scheduled next Monday April 10th.    Pt aware of date/time/location at [facility]    Jae Alex RN 3:17 PM 04/04/23

## 2023-04-04 NOTE — TELEPHONE ENCOUNTER
Note         Pt seen by Dr. Barraza in 2021 for optic atrophy and to continue f/u with Dr. Barraza/neuro-ophthalmology per last note and note from Dr. Durand       Note to patient communicator to assist in scheduling with Dr. Barraza.       Called and LIANG Kinsey Communication Facilitator on 4/4/2023 at 1:44 PM                 Above per patient communicator    Jae Alex RN 2:01 PM 04/04/23

## 2023-04-05 NOTE — PROGRESS NOTES
HPI:  Patient presents for DMV form completion.  She was last seen on 10/6/21 by Dr. Barraza for longstanding, stable optic atrophy.  Since then, she reports her vision is unchanged.  She is happy with her habitual bifocal glasses.  She denies any close calls or accidents while driving.    Testing today:  OCT RNFL:  Right eye: Average RNFL 87 microns.  Significant temporal thinning; stable to previous.    Left eye: Average RNFL 90 microns.  Significant temporal thinning; stable to previous.    Kinetic III4e:  Right eye:  Full field.  Left eye:  Full field.    Assessment/Plan:  It is my impression that this patient's longstanding optic atrophy is stable.  Her visual acuity and OCT RNFL are stable.  She has a full visual field today.  [place]; I completed a DM[place] vision report today.  Follow-up in 1-2 years or sooner with any changes in vision.    Complete documentation of historical and exam elements from today's encounter can be found in the full encounter summary report (not reduplicated in this progress note). I personally obtained the chief complaint(s) and history of present illness. I confirmed and edited as necessary the review of systems, past medical/surgical history, family history, social history, and examination findings as document by others; and I examined the patient myself. I personally reviewed the relevant tests, images, and reports as documented above. I formulated and edited as necessary the assessment and plan and discussed the findings and management plan with the patient and family.    Sanaz Mcmullen OD

## 2023-04-10 ENCOUNTER — OFFICE VISIT (OUTPATIENT)
Dept: OPHTHALMOLOGY | Facility: CLINIC | Age: 43
End: 2023-04-10
Payer: COMMERCIAL

## 2023-04-10 DIAGNOSIS — H47.213 PRIMARY OPTIC ATROPHY OF BOTH EYES: Primary | ICD-10-CM

## 2023-04-10 PROCEDURE — 92133 CPTRZD OPH DX IMG PST SGM ON: CPT

## 2023-04-10 PROCEDURE — 92081 LIMITED VISUAL FIELD XM: CPT

## 2023-04-10 PROCEDURE — 92004 COMPRE OPH EXAM NEW PT 1/>: CPT

## 2023-04-10 PROCEDURE — G0463 HOSPITAL OUTPT CLINIC VISIT: HCPCS

## 2023-04-10 ASSESSMENT — REFRACTION_WEARINGRX
OS_SPHERE: +0.50
OD_AXIS: 165
OS_CYLINDER: +1.00
OS_ADD: +1.75
OD_ADD: +1.75
OD_CYLINDER: +1.50
OD_SPHERE: +1.00
OS_AXIS: 005

## 2023-04-10 ASSESSMENT — TONOMETRY
OS_IOP_MMHG: 16
OD_IOP_MMHG: 18
IOP_METHOD: TONOPEN

## 2023-04-10 ASSESSMENT — VISUAL ACUITY
METHOD: SNELLEN - LINEAR
OS_CC: 20/60
OD_CC: 20/60
OS_CC+: -2
OD_CC+: -1
CORRECTION_TYPE: GLASSES

## 2023-04-10 ASSESSMENT — CUP TO DISC RATIO
OD_RATIO: 0.3
OS_RATIO: 0.3

## 2023-04-10 ASSESSMENT — CONF VISUAL FIELD
METHOD: COUNTING FINGERS
OD_INFERIOR_NASAL_RESTRICTION: 0
OD_NORMAL: 1
OS_NORMAL: 1
OD_INFERIOR_TEMPORAL_RESTRICTION: 0
OD_SUPERIOR_TEMPORAL_RESTRICTION: 0
OS_SUPERIOR_NASAL_RESTRICTION: 0
OS_SUPERIOR_TEMPORAL_RESTRICTION: 0
OS_INFERIOR_TEMPORAL_RESTRICTION: 0
OD_SUPERIOR_NASAL_RESTRICTION: 0
OS_INFERIOR_NASAL_RESTRICTION: 0

## 2023-04-10 ASSESSMENT — SLIT LAMP EXAM - LIDS
COMMENTS: NORMAL
COMMENTS: NORMAL

## 2023-04-10 ASSESSMENT — EXTERNAL EXAM - LEFT EYE: OS_EXAM: NORMAL

## 2023-04-10 ASSESSMENT — EXTERNAL EXAM - RIGHT EYE: OD_EXAM: NORMAL

## 2023-04-10 NOTE — NURSING NOTE
Chief Complaints and History of Present Illnesses   Patient presents with     Annual Eye Exam      Exam  optic atrophy of both eyes  Pt states no change in vision since last visit   Elyse RUIZ 10:56 AM April 10, 2023          Chief Complaint(s) and History of Present Illness(es)     Annual Eye Exam            Laterality: both eyes    Associated symptoms: Negative for eye pain, pain with eye movement, flashes and floaters    Treatments tried: no treatments    Pain scale: 0/10    Comments:  Exam  optic atrophy of both eyes  Pt states no change in vision since last visit   Elyse RUIZ 10:56 AM April 10, 2023

## 2024-09-03 NOTE — PROGRESS NOTES
HPI:    Patient was last seen on 4/10/23 by me for optic atrophy and a DMV vision report.    Since then, she reports her vision is stable.  She denies any vision or ocular health complaints.      Today's Testing:  OCT RNFL:  Right eye: Average RNFL 86 microns (previously 87).  Significant temporal thinning; stable to previous.    Left eye: Average RNFL 89 microns (previously 90).  Significant temporal thinning; stable to previous.     Kinetic III4e:  Right eye:  Full field.  Left eye:  Full field.    Assessment and Plan:  It is my impression that this patient's longstanding optic atrophy is stable.  I recommended she initiate a B-complex.  Her visual acuity and OCT RNFL are stable.  She has a full visual field today.  [place]; I completed a [place] vision report today.  Follow-up in 1-2 years or sooner with any changes in vision.    Complete documentation of historical and exam elements from today's encounter can be found in the full encounter summary report (not reduplicated in this progress note). I personally obtained the chief complaint(s) and history of present illness. I confirmed and edited as necessary the review of systems, past medical/surgical history, family history, social history, and examination findings as document by others; and I examined the patient myself. I personally reviewed the relevant tests, images, and reports as documented above. I formulated and edited as necessary the assessment and plan and discussed the findings and management plan with the patient and family.    Sanaz Mcmullen, WILL

## 2024-09-10 ENCOUNTER — OFFICE VISIT (OUTPATIENT)
Dept: OPHTHALMOLOGY | Facility: CLINIC | Age: 44
End: 2024-09-10
Payer: COMMERCIAL

## 2024-09-10 DIAGNOSIS — H47.20 OPTIC ATROPHY: Primary | ICD-10-CM

## 2024-09-10 PROCEDURE — 92081 LIMITED VISUAL FIELD XM: CPT

## 2024-09-10 PROCEDURE — 92014 COMPRE OPH EXAM EST PT 1/>: CPT

## 2024-09-10 PROCEDURE — 92133 CPTRZD OPH DX IMG PST SGM ON: CPT

## 2024-09-10 PROCEDURE — G0463 HOSPITAL OUTPT CLINIC VISIT: HCPCS

## 2024-09-10 ASSESSMENT — VISUAL ACUITY
METHOD: SNELLEN - LINEAR
OD_CC: 20/60
OD_CC+: -2
OS_CC: 20/60
CORRECTION_TYPE: GLASSES
OS_CC+: -2

## 2024-09-10 ASSESSMENT — CONF VISUAL FIELD
OD_NORMAL: 1
OD_SUPERIOR_NASAL_RESTRICTION: 0
OD_SUPERIOR_TEMPORAL_RESTRICTION: 0
OS_NORMAL: 1
OD_INFERIOR_TEMPORAL_RESTRICTION: 0
OS_SUPERIOR_NASAL_RESTRICTION: 0
METHOD: COUNTING FINGERS
OS_INFERIOR_NASAL_RESTRICTION: 0
OD_INFERIOR_NASAL_RESTRICTION: 0
OS_INFERIOR_TEMPORAL_RESTRICTION: 0
OS_SUPERIOR_TEMPORAL_RESTRICTION: 0

## 2024-09-10 ASSESSMENT — CUP TO DISC RATIO
OD_RATIO: 0.3
OS_RATIO: 0.3

## 2024-09-10 ASSESSMENT — REFRACTION_WEARINGRX
OS_AXIS: 005
OS_ADD: +1.75
OD_CYLINDER: +1.50
OD_AXIS: 165
OD_ADD: +1.75
OD_SPHERE: +1.00
OS_SPHERE: +0.50
OS_CYLINDER: +1.00

## 2024-09-10 ASSESSMENT — SLIT LAMP EXAM - LIDS
COMMENTS: NORMAL
COMMENTS: NORMAL

## 2024-09-10 ASSESSMENT — EXTERNAL EXAM - LEFT EYE: OS_EXAM: NORMAL

## 2024-09-10 ASSESSMENT — TONOMETRY
IOP_METHOD: ICARE
OS_IOP_MMHG: 15
OD_IOP_MMHG: 15

## 2024-09-10 ASSESSMENT — EXTERNAL EXAM - RIGHT EYE: OD_EXAM: NORMAL
